# Patient Record
Sex: FEMALE | NOT HISPANIC OR LATINO | URBAN - METROPOLITAN AREA
[De-identification: names, ages, dates, MRNs, and addresses within clinical notes are randomized per-mention and may not be internally consistent; named-entity substitution may affect disease eponyms.]

---

## 2024-05-14 ENCOUNTER — OFFICE VISIT (OUTPATIENT)
Dept: FAMILY MEDICINE | Facility: CLINIC | Age: 69
End: 2024-05-14
Payer: MEDICARE

## 2024-05-14 VITALS
WEIGHT: 118 LBS | RESPIRATION RATE: 16 BRPM | DIASTOLIC BLOOD PRESSURE: 75 MMHG | BODY MASS INDEX: 19.66 KG/M2 | HEIGHT: 65 IN | TEMPERATURE: 97.6 F | SYSTOLIC BLOOD PRESSURE: 160 MMHG | HEART RATE: 81 BPM | OXYGEN SATURATION: 100 %

## 2024-05-14 DIAGNOSIS — L81.9 DISCOLORATION OF SKIN: ICD-10-CM

## 2024-05-14 DIAGNOSIS — Z00.00 VISIT FOR PREVENTIVE HEALTH EXAMINATION: Primary | ICD-10-CM

## 2024-05-14 DIAGNOSIS — K21.00 GASTROESOPHAGEAL REFLUX DISEASE WITH ESOPHAGITIS WITHOUT HEMORRHAGE: ICD-10-CM

## 2024-05-14 DIAGNOSIS — R03.0 ELEVATED BLOOD PRESSURE READING WITHOUT DIAGNOSIS OF HYPERTENSION: ICD-10-CM

## 2024-05-14 LAB
ANION GAP SERPL CALCULATED.3IONS-SCNC: 12 MMOL/L (ref 7–15)
BASOPHILS # BLD AUTO: 0 10E3/UL (ref 0–0.2)
BASOPHILS NFR BLD AUTO: 1 %
BUN SERPL-MCNC: 13.2 MG/DL (ref 8–23)
CALCIUM SERPL-MCNC: 9.9 MG/DL (ref 8.8–10.2)
CHLORIDE SERPL-SCNC: 96 MMOL/L (ref 98–107)
CHOLEST SERPL-MCNC: 218 MG/DL
CREAT SERPL-MCNC: 0.89 MG/DL (ref 0.51–0.95)
DEPRECATED HCO3 PLAS-SCNC: 24 MMOL/L (ref 22–29)
EGFRCR SERPLBLD CKD-EPI 2021: 69 ML/MIN/1.73M2
EOSINOPHIL # BLD AUTO: 0 10E3/UL (ref 0–0.7)
EOSINOPHIL NFR BLD AUTO: 1 %
ERYTHROCYTE [DISTWIDTH] IN BLOOD BY AUTOMATED COUNT: 12.1 % (ref 10–15)
FASTING STATUS PATIENT QL REPORTED: NO
FASTING STATUS PATIENT QL REPORTED: NO
GLUCOSE SERPL-MCNC: 88 MG/DL (ref 70–99)
HBA1C MFR BLD: 5.5 % (ref 0–5.6)
HCT VFR BLD AUTO: 35.7 % (ref 35–47)
HDLC SERPL-MCNC: 64 MG/DL
HGB BLD-MCNC: 12.2 G/DL (ref 11.7–15.7)
IMM GRANULOCYTES # BLD: 0 10E3/UL
IMM GRANULOCYTES NFR BLD: 0 %
LDLC SERPL CALC-MCNC: 139 MG/DL
LYMPHOCYTES # BLD AUTO: 1.2 10E3/UL (ref 0.8–5.3)
LYMPHOCYTES NFR BLD AUTO: 18 %
MCH RBC QN AUTO: 29.6 PG (ref 26.5–33)
MCHC RBC AUTO-ENTMCNC: 34.2 G/DL (ref 31.5–36.5)
MCV RBC AUTO: 87 FL (ref 78–100)
MONOCYTES # BLD AUTO: 0.5 10E3/UL (ref 0–1.3)
MONOCYTES NFR BLD AUTO: 8 %
NEUTROPHILS # BLD AUTO: 4.8 10E3/UL (ref 1.6–8.3)
NEUTROPHILS NFR BLD AUTO: 73 %
NONHDLC SERPL-MCNC: 154 MG/DL
PLATELET # BLD AUTO: 336 10E3/UL (ref 150–450)
POTASSIUM SERPL-SCNC: 3.8 MMOL/L (ref 3.4–5.3)
RBC # BLD AUTO: 4.12 10E6/UL (ref 3.8–5.2)
SODIUM SERPL-SCNC: 132 MMOL/L (ref 135–145)
TRIGL SERPL-MCNC: 76 MG/DL
WBC # BLD AUTO: 6.5 10E3/UL (ref 4–11)

## 2024-05-14 PROCEDURE — G0439 PPPS, SUBSEQ VISIT: HCPCS

## 2024-05-14 PROCEDURE — 99203 OFFICE O/P NEW LOW 30 MIN: CPT | Mod: 25

## 2024-05-14 PROCEDURE — 85025 COMPLETE CBC W/AUTO DIFF WBC: CPT

## 2024-05-14 PROCEDURE — 36415 COLL VENOUS BLD VENIPUNCTURE: CPT

## 2024-05-14 PROCEDURE — 80061 LIPID PANEL: CPT

## 2024-05-14 PROCEDURE — 80048 BASIC METABOLIC PNL TOTAL CA: CPT

## 2024-05-14 PROCEDURE — 83036 HEMOGLOBIN GLYCOSYLATED A1C: CPT

## 2024-05-14 RX ORDER — RESPIRATORY SYNCYTIAL VIRUS VACCINE 120MCG/0.5
0.5 KIT INTRAMUSCULAR ONCE
Qty: 1 EACH | Refills: 0 | Status: CANCELLED | OUTPATIENT
Start: 2024-05-14 | End: 2024-05-14

## 2024-05-14 RX ORDER — FAMOTIDINE 20 MG/1
20 TABLET, FILM COATED ORAL 2 TIMES DAILY
Qty: 60 TABLET | Refills: 1 | Status: SHIPPED | OUTPATIENT
Start: 2024-05-14 | End: 2024-06-10

## 2024-05-14 SDOH — HEALTH STABILITY: PHYSICAL HEALTH: ON AVERAGE, HOW MANY DAYS PER WEEK DO YOU ENGAGE IN MODERATE TO STRENUOUS EXERCISE (LIKE A BRISK WALK)?: 2 DAYS

## 2024-05-14 ASSESSMENT — SOCIAL DETERMINANTS OF HEALTH (SDOH): HOW OFTEN DO YOU GET TOGETHER WITH FRIENDS OR RELATIVES?: ONCE A WEEK

## 2024-05-14 NOTE — COMMUNITY RESOURCES LIST (PATIENT PREFERRED LANGUAGE)
May 14, 2024           BIN DE LA CRUZ  EE ADEEGYADA IYO RICANAANTONIOJALLY           IYO JEIMYPARMJIT    Navinavelina       [facility]  [address] [place] [address] ([place]: 22.0 miles)  Leonardo: [phone]  Mareegta: [email or web address]  Luuqad: Genoveva lei: Erin  Helitaanka: Ada la janet jacinto, Hoy la'aan, Dhegola' ama maqal adag      [facility] - Shelter for individuals  [address] [place] [address] ([place]: 19.1 miles)  Leonardo: [phone]  Mareegta: [email or web address]  Luuqad: Edna Tomas: Erin  Heledinanka: Jennifer la janet jacinto      [facility]  Leonardo: [phone]  Mareegta: [email or web address]://www.[email or web address] Doctors HospitalUA Tech Dev Foundation/  Luuqad: Keanu Tomas Ingiriis, Soomaali, Edna Adkins: Isn 9:00 subaxnimo - 5:00 galabnimo Andres 9:00 subaxnimo - 5:00 galabnimo Arb 9:00 subaxnimo - 5:00 galabnimo Mendoza 9:00 subaxnimo - 5:00 galabnimo Jesse 9:00 subaxnimo - 5:00 galabnimo  khidmad: Jl Wayne: Hoy aturusho la'aan, Dhegola ama maqal jenniferg, adeegyada treasurejumaada  Xujomarda Aye: Gaaedgarid erin Huerta      [facility]  Leonardo: [phone]  Mareegta: [email or web address]  Luuqad: Ingiriis  Saacadaha: Isn 8:00 subaxnimo - 4:00 galabnimo Andres 8:00 subaxnimo - 4:00 galabnimo Arb 8:00 subaxnimo - 4:00 galabnimo Mendoza 8:00 subaxnimo - 4:00 galabnimo Jesse 8:00 subaxnimo - 4:00 galabnimo  khidmad: Bilaash  Helitaanka: Hoy taurusho la'aan, Dhegola ama maqal adag  Xulashada Gaaedgaridka: Gaadiid [facility]    Kacey Gelitaanka Jeimyyla'aanta      LOVE - LAUNDRY LOVE  Mareegta: http://www.[email or web address]               SIMRAN REYNA  mgaen Leo   911  .   [facility]  211 [email or web address] Regional Medical Center.org  .   Bryanna lópez  [phone] http://Former[facility] Southeastern Regional Medical Centeron.org  http://wisconsinpoison.org  .     Is-dilid iyo Olegario Noyamilex Zepedalaasatimothy  988 http://988VCU Medical Centerline.org  .   Olgeario Patterson lucretia Darjory Ilmaha  Qaranka lucretia Salcedowinta IlTriHealth Good [facility]aritan [facility]  [phone] http://ChildVeterans Health Administrationphotline.org   .   Olegario Britton  ee Gurpreetdgudubjory Galmada ee Qaranka  [phone] (RAJO) http://Rainn.org   .     Badbaadada cararkavelina Qaranka  [phone] (RUNAWAY) http://Presbyterian Kaseman HospitalnaNash[facility] Meharry.org  .   Kevingeerada Faizajory natalymarshall Vince Castro  [facility]/text [phone] [place]: http://ppsupportmn.org [place]: http://Only [place]/wi  .   Olegario Elayne Lang  Tea Hoskinsjuventino ([facility])  [phone]) http://Findtreatment.gov   .                AFEEF: Ramiro mcrae [facility]. Unite  lobo watkins adeCorewell Health Ludington Hospital sheegaciarra doshiiskavelina ng. Unite  lobo vásquez in [facility] sheegay liiskavelina muller.    [facility]

## 2024-05-14 NOTE — PROGRESS NOTES
Preventive Care Visit  [facility] FRICommunity HealthJULIANA Johnson CNP, Nurse Practitioner Primary Care  May 14, 2024      Assessment & Plan     Visit for preventive health examination  Screening labs ordered. Declined all other health maintenance.   - Lipid panel reflex to direct LDL Non-fasting; Future  - CBC with platelets and differential; Future  - Basic metabolic panel  (Ca, Cl, CO2, Creat, Gluc, K, Na, BUN); Future  - Hemoglobin A1c; Future  - Lipid panel reflex to direct LDL Non-fasting  - CBC with platelets and differential  - Basic metabolic panel  (Ca, Cl, CO2, Creat, Gluc, K, Na, BUN)  - Hemoglobin A1c    Discoloration of skin  Suspected vitiligo, will placed derm referral for further evaluation and treatment.   - Adult Dermatology  Referral; Future    Gastroesophageal reflux disease with esophagitis without hemorrhage  Symptoms similar to GERD, famotidine prescribed and plan for patient to follow up if no improvement.   - famotidine (PEPCID) 20 MG tablet; Take 1 tablet (20 mg) by mouth 2 times daily    Elevated blood pressure reading without diagnosis of hypertension  Blood pressure elevated today. Reports being very nervous. Patient has a blood pressure cuff at home, plan for her to check blood pressures daily and follow up in clinic in one month.     Counseling  Appropriate preventive services were discussed with this patient, including applicable screening as appropriate for fall prevention, nutrition, physical activity, Tobacco-use cessation, weight loss and cognition.  Checklist reviewing preventive services available has been given to the patient.  Reviewed patient's diet, addressing concerns and/or questions.   She is at risk for lack of exercise and has been provided with information to increase physical activity for the benefit of her well-being.   The patient was instructed to see the dentist every 6 months.     Anyi Parker is a 69 year old, presenting for the  following:  Wellness Visit (Medicare wellness)        5/14/2024     8:47 AM   Additional Questions   Roomed by topher shields   Accompanied by hiro Gupta         [facility] Care Directive  Patient does not have a Health Care Directive or Living Will: Discussed advance care planning with patient; however, patient declined at this time.    HPI  After she eats she reports coughing and feeling like her throat is itching. Started a couple of months ago, happens everytime she eats, worst is meat, Doesn't cough when she lies down, and reports more belching.          5/14/2024   General Health   How would you rate your overall physical health? Good   Feel stress (tense, anxious, or unable to sleep) Not at all         5/14/2024   Nutrition   Diet: I don't know         5/14/2024   Exercise   Days per week of moderate/strenous exercise 2 days   (!) EXERCISE CONCERN      5/14/2024   Social Factors   Frequency of gathering with friends or relatives Once a week   Worry food won't last until get money to buy more No   Food not last or not have enough money for food? No   Do you have housing?   Yes   Are you worried about losing your housing? No   Lack of transportation? No   Unable to get utilities (heat,electricity)? No   Want help with housing or utility concern? No         5/14/2024   Fall Risk   Fallen 2 or more times in the past year? No   Trouble with walking or balance? No          5/14/2024   Activities of Daily Living- Home Safety   Needs help with the following daily activites None of the above   Safety concerns in the home None of the above         5/14/2024   Dental   Dentist two times every year? (!) NO         5/14/2024   Hearing Screening   Hearing concerns? None of the above         5/14/2024   Driving Risk Screening   Patient/family members have concerns about driving No         5/14/2024   General Alertness/Fatigue Screening   Have you been more tired than usual lately? No         5/14/2024   Urinary Incontinence  Screening   Bothered by leaking urine in past 6 months No         5/14/2024   TB Screening   Were you born outside of the [place]? Yes     Today's PHQ-2 Score:       5/14/2024     8:39 AM   PHQ-2 ( 1999 Pfizer)   Q1: Little interest or pleasure in doing things 0   Q2: Feeling down, depressed or hopeless 0   PHQ-2 Score 0   Q1: Little interest or pleasure in doing things Not at all   Q2: Feeling down, depressed or hopeless Not at all   PHQ-2 Score 0           5/14/2024   Substance Use   Alcohol more than 3/day or more than 7/wk No   Do you have a current opioid prescription? No   How severe/bad is pain from 1 to 10? 0/10 (No Pain)   Do you use any other substances recreationally? No     Social History     Tobacco Use    Smoking status: Never    Smokeless tobacco: Never   Vaping Use    Vaping status: Never Used        Mammogram Screening - Mammography discussed and declined    ASCVD Risk   The ASCVD Risk score (Macy DOE, et al., 2019) failed to calculate for the following reasons:    Cannot find a previous HDL lab    Cannot find a previous total cholesterol lab            Reviewed and updated as needed this visit by Provider   Tobacco  Allergies  Meds  Problems  Med Hx  Surg Hx  Fam Hx              Current providers sharing in care for this patient include:  Patient Care Team:  No Ref-Primary, Physician as PCP - General    The following health maintenance items are reviewed in Epic and correct as of today:  Health Maintenance   Topic Date Due    DEXA  Never done    MAMMO SCREENING  Never done    GLUCOSE  Never done    COLORECTAL CANCER SCREENING  Never done    HEPATITIS C SCREENING  Never done    LIPID  Never done    ZOSTER IMMUNIZATION (1 of 2) Never done    RSV VACCINE (Pregnancy & 60+) (1 - 1-dose 60+ series) Never done    DTAP/TDAP/TD IMMUNIZATION (3 - Td or Tdap) 03/08/2017    Pneumococcal Vaccine: 65+ Years (1 of 1 - PCV) Never done    COVID-19 Vaccine (1 - 2023-24 season) Never done    INFLUENZA  "VACCINE (Season Ended) 09/01/2024    MEDICARE ANNUAL WELLNESS VISIT  05/14/2025    ANNUAL REVIEW OF HM ORDERS  05/14/2025    FALL RISK ASSESSMENT  05/14/2025    ADVANCE CARE PLANNING  05/14/2029    PHQ-2 (once per calendar year)  Completed    IPV IMMUNIZATION  Aged Out    HPV IMMUNIZATION  Aged Out    MENINGITIS IMMUNIZATION  Aged Out    RSV MONOCLONAL ANTIBODY  Aged Out            Objective    Exam  BP (!) 160/75   Pulse 81   Temp 97.6  F (36.4  C) (Oral)   Resp 16   Ht 1.651 m (5' 5\")   Wt 53.5 kg (118 lb)   SpO2 100%   BMI 19.64 kg/m     Estimated body mass index is 19.64 kg/m  as calculated from the following:    Height as of this encounter: 1.651 m (5' 5\").    Weight as of this encounter: 53.5 kg (118 lb).    Physical Exam  GENERAL: alert and no distress  EYES: Eyes grossly normal to inspection, PERRL and conjunctivae and sclerae normal  HENT: ear canals and TM's normal, nose and mouth without ulcers or lesions  NECK: no adenopathy, no asymmetry, masses, or scars  RESP: lungs clear to auscultation - no rales, rhonchi or wheezes  CV: regular rate and rhythm, normal S1 S2, no S3 or S4, no murmur, click or rub, no peripheral edema  ABDOMEN: soft, nontender, no hepatosplenomegaly, no masses and bowel sounds normal  MS: no gross musculoskeletal defects noted, no edema  SKIN: hypopigmentation - and upper chest, arms, face, and lower legs  NEURO: Normal strength and tone, mentation intact and speech normal  PSYCH: mentation appears normal, affect normal/bright        5/14/2024   Mini Cog   Mini-Cog Not Completed (choose reason) Language barrier and no  present   Clock Draw Score 0 Abnormal     Appears cognitively intact, unable to complete mini cog due to language barrier.              Signed Electronically by: JULIANA Tee CNP    "

## 2024-05-14 NOTE — COMMUNITY RESOURCES LIST (ENGLISH)
May 14, 2024           YOUR PERSONALIZED LIST OF SERVICES & PROGRAMS           & SHELTER    Housing      [facility]  [address] [place] [address] (Distance: 22.0 miles)  Phone: [phone]  Website: http://CitiLogics  Language: English  Fee: Free  Accessibility: Ada accessible, Blind accommodation, Deaf or hard of hearing      [facility] ([facility]) - Shelter for individuals  [address] [place] [address] (Distance: 19.1 miles)  Phone: [phone]  Website: http://www.Afluenta  Language: English, Arabic  Fee: Free  Accessibility: Ada S.N. [facility]juicer - [place]  Phone: [phone]  Website: https://www.Kore Virtual Machines/  Language: English, Hmong, Oromo, Sao Tomean, Arabic  Hours: Mon 9:00 AM - 5:00 PM Tue 9:00 AM - 5:00 PM Wed 9:00 AM - 5:00 PM Thu 9:00 AM - 5:00 PM Fri 9:00 AM - 5:00 PM  Fee: Insurance  Accessibility: Blind accommodation, Deaf or hard of hearing, Translation services  Transportation Options: Free transportation    Case Management      [facility] [facility] - Housing Stabilization Services  Phone: [phone]  Website: https://homebasemn.com/  Language: English  Hours: Mon 8:00 AM - 4:00 PM Tue 8:00 AM - 4:00 PM Wed 8:00 AM - 4:00 PM Thu 8:00 AM - 4:00 PM Fri 8:00 AM - 4:00 PM  Fee: Free  Accessibility: Blind accommodation, Deaf or hard of hearing  Transportation Options: Free transportation    Drop-In Services      LOVE - LAUNDRY LOVE  Website: http://www.laundrylove.org               IMPORTANT NUMBERS & WEBSITES        Emergency Services  911  .   [employer] Way  211 http://211unitedway.org  .   Poison Control  (388) 777-3280 http://[place]poison.org http://wisconsinpoison.org  .     Suicide and Crisis Lifeline  988 http://988lifeline.org  .   Childhelp National Child Abuse Hotline  438.295.2944 http://Childhelphotline.org   .   National Sexual Assault Hotline  (473)  635-9895 (HOPE) http://Rainn.org   .     National Runaway Safeline  (890) 426-8720 (RUNAWAY) http://PlexisoftruPhotoMania.Proximetry  .   Pregnancy & Postpartum Support  Call/text [phone]  [place]: http://ppsupportmn.org  [place]: http://psichapters.com/wi  .   Substance Abuse National Helpline ([facility])  605-829-HELP (3445) http://Findtreatment.gov   .                DISCLAIMER: These resources have been generated via the cisimple Platform. cisimple does not endorse any service providers mentioned in this resource list. cisimple does not guarantee that the services mentioned in this resource list will be available to you or will improve your health or wellness.    [facility]

## 2024-05-15 ENCOUNTER — TELEPHONE (OUTPATIENT)
Dept: FAMILY MEDICINE | Facility: CLINIC | Age: 69
End: 2024-05-15
Payer: MEDICARE

## 2024-05-15 DIAGNOSIS — E78.5 HYPERLIPIDEMIA LDL GOAL <100: Primary | ICD-10-CM

## 2024-05-15 NOTE — TELEPHONE ENCOUNTER
"Called patient with assistance of LocalGuiding . Left voice message to return call at [phone].    When patient returns call, please inform of provider's message as written:  \"JULIANA Tee CNP  5/15/2024  7:53 AM CDT Back to Top    Please call patient with a [facility] .     Cholesterol levels are elevated and ASCVD risk factor is 14.2% (meaning that the risk of her developing a stroke or heart attack in the next 10 years is 14.2%). Because of this, I recommend that she starts a cholesterol lowering medication and after she starts the medication, to recheck cholesterol in 6 weeks. Please let me know if this is something they are interested in.     All other labs are normal.     The 10-year ASCVD risk score (Macy DOE, et al., 2019) is: 14.2%\".    MIMI Blanton RN  [facility]  "

## 2024-05-15 NOTE — RESULT ENCOUNTER NOTE
Please call patient with a [facility] .     Cholesterol levels are elevated and ASCVD risk factor is 14.2% (meaning that the risk of her developing a stroke or heart attack in the next 10 years is 14.2%). Because of this, I recommend that she starts a cholesterol lowering medication and after she starts the medication, to recheck cholesterol in 6 weeks. Please let me know if this is something they are interested in.     All other labs are normal.     The 10-year ASCVD risk score (Macy DOE, et al., 2019) is: 14.2%

## 2024-05-17 ENCOUNTER — APPOINTMENT (OUTPATIENT)
Dept: INTERPRETER SERVICES | Facility: CLINIC | Age: 69
End: 2024-05-17
Payer: MEDICARE

## 2024-05-17 RX ORDER — ATORVASTATIN CALCIUM 20 MG/1
20 TABLET, FILM COATED ORAL DAILY
Qty: 90 TABLET | Refills: 0 | Status: SHIPPED | OUTPATIENT
Start: 2024-05-17 | End: 2024-05-21 | Stop reason: SINTOL

## 2024-05-17 NOTE — TELEPHONE ENCOUNTER
"Spoke with patient using Envoy , gave result message:    \"\"Sherley Dunaway, JULIANA CNP  5/15/2024  7:53 AM CDT        Back to Top     Please call patient with a [employer] .     Cholesterol levels are elevated and ASCVD risk factor is 14.2% (meaning that the risk of her developing a stroke or heart attack in the next 10 years is 14.2%). Because of this, I recommend that she starts a cholesterol lowering medication and after she starts the medication, to recheck cholesterol in 6 weeks. Please let me know if this is something they are interested in.     All other labs are normal.     The 10-year ASCVD risk score (Macy DOE, et al., 2019) is: 14.2%\"    Patient willing to try cholesterol medication; pharmacy cued    Lita Cantu RN  [facility]    "

## 2024-05-21 ENCOUNTER — TELEPHONE (OUTPATIENT)
Dept: FAMILY MEDICINE | Facility: CLINIC | Age: 69
End: 2024-05-21
Payer: MEDICARE

## 2024-05-21 DIAGNOSIS — E78.5 HYPERLIPIDEMIA LDL GOAL <100: Primary | ICD-10-CM

## 2024-05-21 RX ORDER — SIMVASTATIN 10 MG
10 TABLET ORAL AT BEDTIME
Qty: 90 TABLET | Refills: 0 | Status: SHIPPED | OUTPATIENT
Start: 2024-05-21

## 2024-05-21 NOTE — TELEPHONE ENCOUNTER
Routing message to provider.    Patients niece calling.  No consent to communicate on file but patient gave permission to talk with Jamesavelina.    Please call [facility] at [phone] with questions     Patient was started  on atorvastatin 5/17/24 and has developed side effects of dizziness, headache and muscle aches.    Please advise.    Christine M Klisch, RN

## 2024-05-21 NOTE — TELEPHONE ENCOUNTER
Left message on answering machine for Candy to call back to the nurse at [phone].    Yuko Hernandez RN  [facility]

## 2024-05-21 NOTE — TELEPHONE ENCOUNTER
Routing to provider.    Spoke with patient and her niece.    Patient is agreeable to starting simvastatin but wants to wait several weeks . She will also work on diet and exercise.    Pharmacy pended.    Christine M Klisch, RN

## 2024-05-21 NOTE — TELEPHONE ENCOUNTER
I recommend that the patient stop taking atorvastatin as it sounds like her symptoms are due to her starting the cholesterol lowering medication. Because her cholesterol levels are elevated, I do recommend that she tries another cholesterol lowering medication called simvastatin. I would recommend that she start this after symptoms have resolved after stopping atorvastatin. Please let me know if they are agreeable and where I should send the simvastatin.

## 2024-06-08 DIAGNOSIS — K21.00 GASTROESOPHAGEAL REFLUX DISEASE WITH ESOPHAGITIS WITHOUT HEMORRHAGE: ICD-10-CM

## 2024-06-10 RX ORDER — FAMOTIDINE 20 MG/1
20 TABLET, FILM COATED ORAL 2 TIMES DAILY
Qty: 180 TABLET | Refills: 1 | Status: SHIPPED | OUTPATIENT
Start: 2024-06-10

## 2024-06-18 ENCOUNTER — TELEPHONE (OUTPATIENT)
Dept: FAMILY MEDICINE | Facility: CLINIC | Age: 69
End: 2024-06-18
Payer: MEDICARE

## 2024-06-18 NOTE — LETTER
June 18, 2024      Elena Martin  [address] [place], [place] [address]P[facility] [address]        Your team at [facility] cares about your health. We have reviewed your chart and based on our findings; we are making the following recommendations to better manage your health.     You are in particular need of attention regarding the following:     Call or MyChart message your clinic to schedule a colonoscopy, schedule/ a FIT Test, or order a Cologuard test. If you are unsure what type of test you need, please call your clinic and speak to clinic staff.   Colon cancer is now the second leading cause of cancer-related deaths in the [facility] for both men and women and there are over 130,000 new cases and 50,000 deaths per year from colon cancer. Colonoscopies can prevent 90-95% of these deaths. Problem lesions can be removed before they ever become cancer. This test is not only looking for cancer, but also getting rid of precancerous lesions.   If you are under/uninsured, we recommend you contact the Thismoments Program.Thismoments is a free colorectal cancer screening program that provides colonoscopies for eligible under/uninsured [place] men and women. If you are interested in receiving a free colonoscopy, please call [facility] at t [phone] (mention code ScopesWeb) to see if you're eligible. Please have them send us the results.   Contact your clinic to schedule/ your FIT Test.   Schedule Annual MAMMOGRAPHY. [facility] scheduling number is [phone] or schedule in [employer]shart (self referral).  1 in 8 women will develop invasive breast cancer during her lifetime and it is the most common non-skin cancer in [place] Women. EARLY detection, new treatments, and a better understanding of the disease have increased survival rates- the 5 year survival rate in the 1960's was 63% and today it is close to 90%.  If you are under/uninsured, we recommend you contact the [facility] Program. They offer  mammograms at no charge or on a sliding fee charge. You can schedule with them at [phone]. Please have them send us the results.     If you have already completed these items, please contact the clinic via phone or   Promodityhart so your care team can review and update your records. Thank you for   choosing [facility] for your healthcare needs. For any questions,   concerns, or to schedule an appointment please contact our clinic.    Healthy Regards,      Your [facility] Care Team

## 2024-06-18 NOTE — TELEPHONE ENCOUNTER
Patient Quality Outreach    Patient is due for the following:   Hypertension -  Hypertension follow-up visit  Colon Cancer Screening  Breast Cancer Screening - Mammogram    Next Steps:   Pt needs to schedule mammo and colonoscopy    Type of outreach:    Sent letter.      Questions for provider review:    None           Byron Fraire MA

## 2024-06-26 ENCOUNTER — OFFICE VISIT (OUTPATIENT)
Dept: FAMILY MEDICINE | Facility: CLINIC | Age: 69
End: 2024-06-26
Payer: MEDICARE

## 2024-06-26 VITALS
HEART RATE: 82 BPM | OXYGEN SATURATION: 98 % | SYSTOLIC BLOOD PRESSURE: 193 MMHG | WEIGHT: 116 LBS | DIASTOLIC BLOOD PRESSURE: 95 MMHG | TEMPERATURE: 98 F | RESPIRATION RATE: 12 BRPM | BODY MASS INDEX: 19.3 KG/M2

## 2024-06-26 DIAGNOSIS — H25.9 AGE-RELATED CATARACT OF BOTH EYES, UNSPECIFIED AGE-RELATED CATARACT TYPE: ICD-10-CM

## 2024-06-26 DIAGNOSIS — Z01.818 PRE-OPERATIVE EXAMINATION: Primary | ICD-10-CM

## 2024-06-26 DIAGNOSIS — Z01.818 PREOP GENERAL PHYSICAL EXAM: ICD-10-CM

## 2024-06-26 DIAGNOSIS — R03.0 ELEVATED BLOOD PRESSURE READING WITHOUT DIAGNOSIS OF HYPERTENSION: ICD-10-CM

## 2024-06-26 PROCEDURE — 99214 OFFICE O/P EST MOD 30 MIN: CPT | Performed by: NURSE PRACTITIONER

## 2024-06-26 RX ORDER — PREDNISOLONE ACETATE 10 MG/ML
SUSPENSION/ DROPS OPHTHALMIC
COMMUNITY
Start: 2024-06-13

## 2024-06-26 RX ORDER — RESPIRATORY SYNCYTIAL VIRUS VACCINE 120MCG/0.5
0.5 KIT INTRAMUSCULAR ONCE
Qty: 1 EACH | Refills: 0 | Status: CANCELLED | OUTPATIENT
Start: 2024-06-26 | End: 2024-06-26

## 2024-06-26 RX ORDER — KETOROLAC TROMETHAMINE 5 MG/ML
SOLUTION OPHTHALMIC
COMMUNITY
Start: 2024-06-13

## 2024-06-26 RX ORDER — OFLOXACIN 3 MG/ML
SOLUTION/ DROPS OPHTHALMIC
COMMUNITY
Start: 2024-06-13

## 2024-06-26 NOTE — PROGRESS NOTES
[facility] Evaluation  [facility]  [address]  [place] [address]  Phone: [phone]  Primary Provider: Physician No Ref-Primary  Pre-op Performing Provider: Reina Rose, NP, APRN CNP  Jun 26, 2024 6/26/2024   Surgical Information   What procedure is being done? cataract surgery   Facility or Hospital where procedure/surgery will be performed: [facility] [place]   Who is doing the procedure / surgery? Dr Deleon   Date of surgery / procedure: 6/27/24   Time of surgery / procedure: 9:30am   Where do you plan to recover after surgery? at home with family        Fax number for surgical facility: need to call to get fax number, norm states the clinic can access Epic     Assessment & Plan     The proposed surgical procedure is considered LOW risk.    (Z01.818) Pre-operative examination  (primary encounter diagnosis)  Comment: Ok for surgery today     (H25.9) Age-related cataract of both eyes, unspecified age-related cataract type  Comment: chronic, blurry vision, surgery scheduled     (R03.0) Elevated blood pressure reading without diagnosis of hypertension  Comment: Always high in the clinic, better at home, discussed how to take pressure at home and some lifestyle changes to lower blood pressure          - No identified additional risk factors other than previously addressed    Antiplatelet or Anticoagulation Medication Instructions   - Patient is on no antiplatelet or anticoagulation medications.   - Bleeding risk is low for this procedure (e.g. dental, skin, cataract).    Additional Medication Instructions  Take all scheduled medications on the day of surgery    Recommendation  Approval given to proceed with proposed procedure, without further diagnostic evaluation.    Anyi Parker is a 69 year old, presenting for the following:  Pre-Op Exam          6/26/2024    10:41 AM   Additional Questions   Roomed by topher shields    Accompanied by niece- raannea     HPI related to upcoming procedure: bilateral cataracts, now having issues with blurry vision, dx 4/2024, surgery scheduled for tomorrow         6/26/2024   Pre-Op Questionnaire   Have you ever had a heart attack or stroke? No   Have you ever had surgery on your heart or blood vessels, such as a stent placement, a coronary artery bypass, or surgery on an artery in your head, neck, heart, or legs? No   Do you have chest pain with activity? No   Do you have a history of heart failure? No   Do you currently have a cold, bronchitis or symptoms of other infection? No   Do you have a cough, shortness of breath, or wheezing? No   Do you or anyone in your family have previous history of blood clots? No   Do you or does anyone in your family have a serious bleeding problem such as prolonged bleeding following surgeries or cuts? No   Have you ever had problems with anemia or been told to take iron pills? No   Have you had any abnormal blood loss such as black, tarry or bloody stools, or abnormal vaginal bleeding? No   Have you ever had a blood transfusion? No   Are you willing to have a blood transfusion if it is medically needed before, during, or after your surgery? YES   Have you or any of your relatives ever had problems with anesthesia? No   Do you have sleep apnea, excessive snoring or daytime drowsiness? No   Do you have any artifical heart valves or other implanted medical devices like a pacemaker, defibrillator, or continuous glucose monitor? No   Do you have artificial joints? No   Are you allergic to latex? No        Health Care Directive  Patient does not have a Health Care Directive or Living Will: Discussed advance care planning with patient; information given to patient to review.    Preoperative Review of    reviewed - no record of controlled substances prescribed.      Status of Chronic Conditions:  See problem list for active medical problems.  Problems all longstanding  "and stable, except as noted/documented.  See ROS for pertinent symptoms related to these conditions.    Patient Active Problem List    Diagnosis Date Noted    Nonspecific reaction to tuberculin skin test without active tuberculosis 09/15/2006     Priority: Medium      No past medical history on file.  No past surgical history on file.  Current Outpatient Medications   Medication Sig Dispense Refill    famotidine (PEPCID) 20 MG tablet TAKE 1 TABLET BY MOUTH TWICE A  tablet 1    ketorolac (ACULAR) 0.5 % ophthalmic solution PLEASE SEE ATTACHED FOR DETAILED DIRECTIONS      ofloxacin (OCUFLOX) 0.3 % ophthalmic solution PLEASE SEE ATTACHED FOR DETAILED DIRECTIONS      prednisoLONE acetate (PRED FORTE) 1 % ophthalmic suspension PLEASE SEE ATTACHED FOR DETAILED DIRECTIONS      simvastatin (ZOCOR) 10 MG tablet Take 1 tablet (10 mg) by mouth at bedtime (Patient not taking: Reported on 6/26/2024) 90 tablet 0       No Known Allergies     Social History     Tobacco Use    Smoking status: Never    Smokeless tobacco: Never   Substance Use Topics    Alcohol use: Not on file     No family history on file.  History   Drug Use Not on file         Review of Systems  Constitutional, HEENT, cardiovascular, pulmonary, GI, , musculoskeletal, neuro, skin, endocrine and psych systems are negative, except as otherwise noted. BP runs high when in clinic due to stress but returns to normal at home, Systolic- 127-160, diastolic 70-80    Objective    BP (!) 193/95 (BP Location: Left arm, Patient Position: Sitting, Cuff Size: Adult Small) BP repeated at end of exam with medium cuff, left arm 170/80  Pulse 82   Temp 98  F (36.7  C) (Oral)   Resp 12   Wt 52.6 kg (116 lb)   SpO2 98%   BMI 19.30 kg/m     Estimated body mass index is 19.3 kg/m  as calculated from the following:    Height as of 5/14/24: 1.651 m (5' 5\").    Weight as of this encounter: 52.6 kg (116 lb).  Physical Exam  GENERAL: alert and no distress  EYES: Eyes grossly " normal to inspection, PERRL and conjunctivae and sclerae normal  HENT: ear canals and TM's normal, nose and mouth without ulcers or lesions  NECK: no adenopathy, no asymmetry, masses, or scars  RESP: lungs clear to auscultation - no rales, rhonchi or wheezes  CV: regular rate and rhythm, normal S1 S2, no S3 or S4, no murmur, click or rub, no peripheral edema  ABDOMEN: soft, nontender, no hepatosplenomegaly, no masses and bowel sounds normal  MS: no gross musculoskeletal defects noted, no edema  SKIN: no suspicious lesions or rashes and hypopigmentation - scattered  NEURO: Normal strength and tone, mentation intact and speech normal  PSYCH: mentation appears normal, affect normal/bright    Recent Labs   Lab Test 05/14/24  0941   HGB 12.2      *   POTASSIUM 3.8   CR 0.89   A1C 5.5        Diagnostics  No labs were ordered during this visit.   No EKG required for low risk surgery (cataract, skin procedure, breast biopsy, etc).    Revised Cardiac Risk Index (RCRI)  The patient has the following serious cardiovascular risks for perioperative complications:   - No serious cardiac risks = 0 points     RCRI Interpretation: 0 points: Class I (very low risk - 0.4% complication rate)       Signed Electronically by: Reina Rose, CARLITA, APRN CNP  Copy of this evaluation report is provided to requesting physician.

## 2024-06-26 NOTE — PATIENT INSTRUCTIONS
You are good to go for your surgery    Please continue to take you blood pressure at home and try some additional lifestyle changes    Please remember to take your blood pressure as directed on your machine   How to Take Your Medication Before Surgery  Preoperative Medication Instructions   Antiplatelet or Anticoagulation Medication Instructions   - Patient is on no antiplatelet or anticoagulation medications.   - Bleeding risk is low for this procedure (e.g. dental, skin, cataract).    Additional Medication Instructions  Take all scheduled medications on the day of surgery       Patient Education   Preparing for Your Surgery  Getting started  A nurse will call you to review your health history and instructions. They will give you an arrival time based on your scheduled surgery time. Please be ready to share:  Your doctor's clinic name and phone number  Your medical, surgical, and anesthesia history  A list of allergies and sensitivities  A list of medicines, including herbal treatments and over-the-counter drugs  Whether the patient has a legal guardian (ask how to send us the papers in advance)  Please tell us if you're pregnant--or if there's any chance you might be pregnant. Some surgeries may injure a fetus (unborn baby), so they require a pregnancy test. Surgeries that are safe for a fetus don't always need a test, and you can choose whether to have one.   If you have a child who's having surgery, please ask for a copy of Preparing for Your Child's Surgery.    Preparing for surgery  Within 10 to 30 days of surgery: Have a pre-op exam (sometimes called an H&P, or History and Physical). This can be done at a clinic or pre-operative center.  If you're having a , you may not need this exam. Talk to your care team.  At your pre-op exam, talk to your care team about all medicines you take. If you need to stop any medicines before surgery, ask when to start taking them again.  We do this for your safety.  Many medicines can make you bleed too much during surgery. Some change how well surgery (anesthesia) drugs work.  Call your insurance company to let them know you're having surgery. (If you don't have insurance, call [phone].)  Call your clinic if there's any change in your health. This includes signs of a cold or flu (sore throat, runny nose, cough, rash, fever). It also includes a scrape or scratch near the surgery site.  If you have questions on the day of surgery, call your hospital or surgery center.  Eating and drinking guidelines  For your safety: Unless your surgeon tells you otherwise, follow the guidelines below.  Eat and drink as usual until 8 hours before you arrive for surgery. After that, no food or milk.  Drink clear liquids until 2 hours before you arrive. These are liquids you can see through, like water, Gatorade, and Propel Water. They also include plain black coffee and tea (no cream or milk), candy, and breath mints. You can spit out gum when you arrive.  If you drink alcohol: Stop drinking it the night before surgery.  If your care team tells you to take medicine on the morning of surgery, it's okay to take it with a sip of water.  Preventing infection  Shower or bathe the night before and morning of your surgery. Follow the instructions your clinic gave you. (If no instructions, use regular soap.)  Don't shave or clip hair near your surgery site. We'll remove the hair if needed.  Don't smoke or vape the morning of surgery. You may chew nicotine gum up to 2 hours before surgery. A nicotine patch is okay.  Note: Some surgeries require you to completely quit smoking and nicotine. Check with your surgeon.  Your care team will make every effort to keep you safe from infection. We will:  Clean our hands often with soap and water (or an alcohol-based hand rub).  Clean the skin at your surgery site with a special soap that kills germs.  Give you a special gown to keep you warm. (Cold raises the  risk of infection.)  Wear special hair covers, masks, gowns and gloves during surgery.  Give antibiotic medicine, if prescribed. Not all surgeries need antibiotics.  What to bring on the day of surgery  Photo ID and insurance card  Copy of your health care directive, if you have one  Glasses and hearing aids (bring cases)  You can't wear contacts during surgery  Inhaler and eye drops, if you use them (tell us about these when you arrive)  CPAP machine or breathing device, if you use them  A few personal items, if spending the night  If you have . . .  A pacemaker, ICD (cardiac defibrillator) or other implant: Bring the ID card.  An implanted stimulator: Bring the remote control.  A legal guardian: Bring a copy of the certified (court-stamped) guardianship papers.  Please remove any jewelry, including body piercings. Leave jewelry and other valuables at home.  If you're going home the day of surgery  You must have a responsible adult drive you home. They should stay with you overnight as well.  If you don't have someone to stay with you, and you aren't safe to go home alone, we may keep you overnight. Insurance often won't pay for this.  After surgery  If it's hard to control your pain or you need more pain medicine, please call your surgeon's office.  Questions?   If you have any questions for your care team, list them here: _________________________________________________________________________________________________________________________________________________________________________ ____________________________________ ____________________________________ ____________________________________  For informational purposes only. Not to replace the advice of your health care provider. Copyright   2003, 2019 [facility]. All rights reserved. Clinically reviewed by Danielle David MD. SMART[employer] [id number] - REV 12/22.

## 2024-08-20 ENCOUNTER — TELEPHONE (OUTPATIENT)
Dept: FAMILY MEDICINE | Facility: CLINIC | Age: 69
End: 2024-08-20
Payer: MEDICARE

## 2024-08-20 NOTE — TELEPHONE ENCOUNTER
Patient Quality Outreach    Patient is due for the following:   Hypertension -  Hypertension follow-up visit  Colon Cancer Screening  Breast Cancer Screening - Mammogram      Topic Date Due    Zoster (Shingles) Vaccine (1 of 2) Never done    Diptheria Tetanus Pertussis (DTAP/TDAP/TD) Vaccine (3 - Td or Tdap) 03/08/2017    Pneumococcal Vaccine (1 of 1 - PCV) Never done    COVID-19 Vaccine (1 - 2023-24 season) Never done       Next Steps:   Schedule a office visit for hypertension    Type of outreach:    Sent letter.    Next Steps:  Reach out within 90 days via Letter.    Max number of attempts reached: No. Will try again in 90 days if patient still on fail list.    Questions for provider review:    None           Byron Fraire MA

## 2025-01-27 ENCOUNTER — TELEPHONE (OUTPATIENT)
Dept: FAMILY MEDICINE | Facility: CLINIC | Age: 70
End: 2025-01-27
Payer: MEDICARE

## 2025-01-27 NOTE — LETTER
January 27, 2025      Elena Martin  [address] [address]          Your team at [facility] cares about your health. We have reviewed your chart and based on our findings; we are making the following recommendations to better manage your health.     You are in particular need of attention regarding the following:     Call or MyChart message your clinic to schedule a colonoscopy, schedule/ a FIT Test, or order a Cologuard test. If you are unsure what type of test you need, please call your clinic and speak to clinic staff.   Colon cancer is now the second leading cause of cancer-related deaths in the [facility] for both men and women and there are over 130,000 new cases and 50,000 deaths per year from colon cancer. Colonoscopies can prevent 90-95% of these deaths. Problem lesions can be removed before they ever become cancer. This test is not only looking for cancer, but also getting rid of precancerous lesions.   If you are under/uninsured, we recommend you contact the [employer] [facility].[employer] is a free colorectal cancer screening program that provides colonoscopies for eligible under/uninsured [place] men and women. If you are interested in receiving a free colonoscopy, please call [employer] at t [phone] (mention code [employer]) to see if you're eligible. Please have them send us the results.   Contact your clinic to schedule/ your FIT Test.   Schedule Annual MAMMOGRAPHY. [facility] scheduling number is [phone] or schedule in [employer] (self referral).  1 in 8 women will develop invasive breast cancer during her lifetime and it is the most common non-skin cancer in [place]. EARLY detection, new treatments, and a better understanding of the disease have increased survival rates- the 5 year survival rate in the 1960's was 63% and today it is close to 90%.  If you are under/uninsured, we recommend you contact the [employer] Program.  They offer mammograms at no charge or on a sliding fee charge. You can schedule with them at [phone]. Please have them send us the results.     If you have already completed these items, please contact the clinic via phone or   [employer] so your care team can review and update your records. Thank you for   choosing [facility] for your healthcare needs. For any questions,   concerns, or to schedule an appointment please contact our clinic.    Healthy Regards,      Your [facility] Care Team

## 2025-01-27 NOTE — TELEPHONE ENCOUNTER
Patient Quality Outreach    Patient is due for the following:   Colon Cancer Screening  Breast Cancer Screening - Mammogram    Action(s) Taken:   Needs to schedule mammo and colonoscopy    Type of outreach:    Sent letter.    Questions for provider review:    None           Byron Fraire MA